# Patient Record
Sex: FEMALE | URBAN - METROPOLITAN AREA
[De-identification: names, ages, dates, MRNs, and addresses within clinical notes are randomized per-mention and may not be internally consistent; named-entity substitution may affect disease eponyms.]

---

## 2023-02-02 ENCOUNTER — OFFICE VISIT (OUTPATIENT)
Facility: LOCATION | Age: 48
End: 2023-02-02
Payer: COMMERCIAL

## 2023-02-02 DIAGNOSIS — E11.9 TYPE 2 DIABETES MELLITUS W/O COMPLICATION: Primary | ICD-10-CM

## 2023-02-02 DIAGNOSIS — H40.013 OPEN ANGLE WITH BORDERLINE FINDINGS, LOW RISK, BILATERAL: ICD-10-CM

## 2023-02-02 DIAGNOSIS — H04.123 TEAR FILM INSUFFICIENCY OF BILATERAL LACRIMAL GLANDS: ICD-10-CM

## 2023-02-02 DIAGNOSIS — H25.13 AGE-RELATED NUCLEAR CATARACT, BILATERAL: ICD-10-CM

## 2023-02-02 DIAGNOSIS — H43.812 VITREOUS DEGENERATION, LEFT EYE: ICD-10-CM

## 2023-02-02 PROCEDURE — 92004 COMPRE OPH EXAM NEW PT 1/>: CPT | Performed by: OPTOMETRIST

## 2023-02-02 PROCEDURE — 92250 FUNDUS PHOTOGRAPHY W/I&R: CPT | Performed by: OPTOMETRIST

## 2023-02-02 PROCEDURE — 92134 CPTRZ OPH DX IMG PST SGM RTA: CPT | Performed by: OPTOMETRIST

## 2023-02-02 ASSESSMENT — VISUAL ACUITY
OD: 20/30
OS: 20/20

## 2023-02-02 ASSESSMENT — KERATOMETRY
OD: 46.00
OS: 46.13

## 2023-02-02 ASSESSMENT — INTRAOCULAR PRESSURE
OS: 15
OD: 18

## 2023-02-02 NOTE — IMPRESSION/PLAN
Impression: Type 2 diabetes mellitus w/o complication: Q77.3. Plan: Diabetes type II: no background retinopathy, no signs of neovascularization noted. Discussed ocular and systemic benefits of blood sugar control.

## 2023-02-02 NOTE — IMPRESSION/PLAN
Impression: Open angle with borderline findings, low risk, bilateral: H40.013. Plan: Discussed findings of today's exam with patient, including diagnosis in detail. Would like patient to return for testing to rule out Glaucoma. Recommend OCT, Pachymetry, Gonioscopy and Glaucoma Evaluation next available. Patient aware this appointment will take longer than the average appointment. Call with questions, changes in vision or new symptoms. RTC: 2-4 weeks [facility] OCT/PACH/IOP

## 2023-03-24 NOTE — IMPRESSION/PLAN
Impression: Open angle with borderline findings, low risk, bilateral: H40.013. Plan: Patient advised of stable condition and IOP stable without medication Will continue to monitor intraocular pressure. No treatment is being implemented at this time. 
RTC: 6 month Visual Field 24-2 and IOP Check